# Patient Record
Sex: FEMALE | Race: OTHER
[De-identification: names, ages, dates, MRNs, and addresses within clinical notes are randomized per-mention and may not be internally consistent; named-entity substitution may affect disease eponyms.]

---

## 2020-03-27 ENCOUNTER — HOSPITAL ENCOUNTER (EMERGENCY)
Dept: HOSPITAL 61 - ER | Age: 45
Discharge: HOME | End: 2020-03-27
Payer: SELF-PAY

## 2020-03-27 VITALS — HEIGHT: 65 IN | WEIGHT: 165.57 LBS | BODY MASS INDEX: 27.58 KG/M2

## 2020-03-27 VITALS — SYSTOLIC BLOOD PRESSURE: 95 MMHG | DIASTOLIC BLOOD PRESSURE: 62 MMHG

## 2020-03-27 DIAGNOSIS — N39.0: Primary | ICD-10-CM

## 2020-03-27 DIAGNOSIS — E87.1: ICD-10-CM

## 2020-03-27 DIAGNOSIS — R10.13: ICD-10-CM

## 2020-03-27 DIAGNOSIS — R11.0: ICD-10-CM

## 2020-03-27 LAB
ALBUMIN SERPL-MCNC: 3.4 G/DL (ref 3.4–5)
ALBUMIN/GLOB SERPL: 0.7 {RATIO} (ref 1–1.7)
ALP SERPL-CCNC: 86 U/L (ref 46–116)
ALT SERPL-CCNC: 16 U/L (ref 14–59)
ANION GAP SERPL CALC-SCNC: 7 MMOL/L (ref 6–14)
APTT PPP: (no result) S
AST SERPL-CCNC: 17 U/L (ref 15–37)
BACTERIA #/AREA URNS HPF: (no result) /HPF
BASOPHILS # BLD AUTO: 0 X10^3/UL (ref 0–0.2)
BASOPHILS NFR BLD: 1 % (ref 0–3)
BILIRUB SERPL-MCNC: 0.3 MG/DL (ref 0.2–1)
BILIRUB UR QL STRIP: (no result)
BUN SERPL-MCNC: 4 MG/DL (ref 7–20)
BUN/CREAT SERPL: 6 (ref 6–20)
CALCIUM SERPL-MCNC: 9 MG/DL (ref 8.5–10.1)
CHLORIDE SERPL-SCNC: 94 MMOL/L (ref 98–107)
CO2 SERPL-SCNC: 28 MMOL/L (ref 21–32)
CREAT SERPL-MCNC: 0.7 MG/DL (ref 0.6–1)
EOSINOPHIL NFR BLD: 0.1 X10^3/UL (ref 0–0.7)
EOSINOPHIL NFR BLD: 1 % (ref 0–3)
ERYTHROCYTE [DISTWIDTH] IN BLOOD BY AUTOMATED COUNT: 15.1 % (ref 11.5–14.5)
FIBRINOGEN PPP-MCNC: CLEAR MG/DL
GFR SERPLBLD BASED ON 1.73 SQ M-ARVRAT: 90.1 ML/MIN
GLOBULIN SER-MCNC: 4.7 G/DL (ref 2.2–3.8)
GLUCOSE SERPL-MCNC: 85 MG/DL (ref 70–99)
HCT VFR BLD CALC: 34.4 % (ref 36–47)
HGB BLD-MCNC: 11.7 G/DL (ref 12–15.5)
LIPASE: 115 U/L (ref 73–393)
LYMPHOCYTES # BLD: 2.4 X10^3/UL (ref 1–4.8)
LYMPHOCYTES NFR BLD AUTO: 24 % (ref 24–48)
MCH RBC QN AUTO: 30 PG (ref 25–35)
MCHC RBC AUTO-ENTMCNC: 34 G/DL (ref 31–37)
MCV RBC AUTO: 87 FL (ref 79–100)
MONO #: 0.8 X10^3/UL (ref 0–1.1)
MONOCYTES NFR BLD: 8 % (ref 0–9)
NEUT #: 6.6 X10^3/UL (ref 1.8–7.7)
NEUTROPHILS NFR BLD AUTO: 66 % (ref 31–73)
NITRITE UR QL STRIP: NEGATIVE
PH UR STRIP: 7.5 [PH]
PLATELET # BLD AUTO: 275 X10^3/UL (ref 140–400)
POTASSIUM SERPL-SCNC: 3.7 MMOL/L (ref 3.5–5.1)
PROT SERPL-MCNC: 8.1 G/DL (ref 6.4–8.2)
PROT UR STRIP-MCNC: NEGATIVE MG/DL
RBC # BLD AUTO: 3.95 X10^6/UL (ref 3.5–5.4)
RBC #/AREA URNS HPF: 0 /HPF (ref 0–2)
SODIUM SERPL-SCNC: 129 MMOL/L (ref 136–145)
SQUAMOUS #/AREA URNS LPF: (no result) /LPF
UROBILINOGEN UR-MCNC: 1 MG/DL
WBC # BLD AUTO: 10 X10^3/UL (ref 4–11)

## 2020-03-27 PROCEDURE — 85025 COMPLETE CBC W/AUTO DIFF WBC: CPT

## 2020-03-27 PROCEDURE — 96374 THER/PROPH/DIAG INJ IV PUSH: CPT

## 2020-03-27 PROCEDURE — 99283 EMERGENCY DEPT VISIT LOW MDM: CPT

## 2020-03-27 PROCEDURE — 87086 URINE CULTURE/COLONY COUNT: CPT

## 2020-03-27 PROCEDURE — 84484 ASSAY OF TROPONIN QUANT: CPT

## 2020-03-27 PROCEDURE — 36415 COLL VENOUS BLD VENIPUNCTURE: CPT

## 2020-03-27 PROCEDURE — 80053 COMPREHEN METABOLIC PANEL: CPT

## 2020-03-27 PROCEDURE — 81001 URINALYSIS AUTO W/SCOPE: CPT

## 2020-03-27 PROCEDURE — 83690 ASSAY OF LIPASE: CPT

## 2020-03-27 NOTE — PHYS DOC
Adult General


Chief Complaint


Chief Complaint:  ABDOMINAL PAIN





HPI


HPI





Patient is a 44  year old  speaking female who presents with complaint 

of epigastric abdominal pain.  She is accompanied by her son who is interpreting

for her.  He reports the patient has been sick with some flulike symptoms 

approximately 1 week ago but seem to be doing better and over the past day or so

developed some epigastric abdominal pain with some nausea.  No vomiting or 

diarrhea or fever or chills reported.  No medications taken prior to arrival.  

No sick contacts.  Pain is mild to moderate





Review of Systems


Review of Systems


All other systems were reviewed and found to be within normal limits, except as 

documented in this note.





Current Medications


Current Medications





Current Medications








 Medications


  (Trade)  Dose


 Ordered  Sig/Candace  Start Time


 Stop Time Status Last Admin


Dose Admin


 


 Ondansetron HCl


  (Zofran)  4 mg  1X  ONCE  3/27/20 10:15


 3/27/20 10:16 DC 3/27/20 10:20


4 MG


 


 Sodium Chloride  1,000 ml @ 


 1,000 mls/hr  1X  ONCE  3/27/20 10:15


 3/27/20 11:14  3/27/20 10:20


1,000 MLS/HR











Allergies


Allergies





Allergies








Coded Allergies Type Severity Reaction Last Updated Verified


 


  No Known Drug Allergies    3/27/20 No











Physical Exam


Physical Exam





Constitutional: Well developed, well nourished, no acute distress, non-toxic 

appearance. []


HENT: Normocephalic, atraumatic, bilateral external ears normal, oropharynx 

moist, no oral exudates, nose normal. []


Eyes: PERRLA, EOMI, conjunctiva normal, no discharge. [] 


Neck: Normal range of motion, no tenderness, supple, no stridor. [] 


Cardiovascular:Heart rate regular rhythm, no murmur []


Lungs & Thorax:  Bilateral breath sounds clear to auscultation []


Abdomen: Bowel sounds normal, soft, mild epigastric tenderness to palpation, no 

masses, no pulsatile masses. [] 


Skin: Warm, dry, no erythema, no rash. [] 


Back: No tenderness, no CVA tenderness. [] 


Extremities: No tenderness, no cyanosis, no clubbing, ROM intact, no edema. [] 


Neurologic: Alert and oriented X 3, normal motor function, normal sensory 

function, no focal deficits noted. []


Psychologic: Affect normal, judgement normal, mood normal. []





Current Patient Data


Vital Signs





                                   Vital Signs








  Date Time  Temp Pulse Resp B/P (MAP) Pulse Ox O2 Delivery O2 Flow Rate FiO2


 


3/27/20 09:46 99.1 86 16 100/68 (79) 97 Room Air  





 99.1       








Lab Values





                                Laboratory Tests








Test


 3/27/20


09:51 3/27/20


10:10


 


Urine Collection Type Unknown   


 


Urine Color Deepa   


 


Urine Clarity Clear   


 


Urine pH


 7.5 (<5.0-8.0)


 





 


Urine Specific Gravity


 1.015


(1.000-1.030) 





 


Urine Protein


 Negative mg/dL


(NEG-TRACE) 





 


Urine Glucose (UA)


 Negative mg/dL


(NEG) 





 


Urine Ketones (Stick)


 15 mg/dL (NEG)


 





 


Urine Blood


 Negative (NEG)


 





 


Urine Nitrite


 Negative (NEG)


 





 


Urine Bilirubin Small (NEG)   


 


Urine Urobilinogen Dipstick


 1.0 mg/dL (0.2


mg/dL) 





 


Urine Leukocyte Esterase


 Moderate (NEG)


 





 


Urine RBC 0 /HPF (0-2)   


 


Urine WBC


 11-20 /HPF


(0-4) 





 


Urine Squamous Epithelial


Cells Mod /LPF  


 





 


Urine Bacteria


 Few /HPF


(0-FEW) 





 


Urine Mucus Marked /LPF   


 


White Blood Count


 


 10.0 x10^3/uL


(4.0-11.0)


 


Red Blood Count


 


 3.95 x10^6/uL


(3.50-5.40)


 


Hemoglobin


 


 11.7 g/dL


(12.0-15.5)  L


 


Hematocrit


 


 34.4 %


(36.0-47.0)  L


 


Mean Corpuscular Volume


 


 87 fL ()





 


Mean Corpuscular Hemoglobin  30 pg (25-35)  


 


Mean Corpuscular Hemoglobin


Concent 


 34 g/dL


(31-37)


 


Red Cell Distribution Width


 


 15.1 %


(11.5-14.5)  H


 


Platelet Count


 


 275 x10^3/uL


(140-400)


 


Neutrophils (%) (Auto)  66 % (31-73)  


 


Lymphocytes (%) (Auto)  24 % (24-48)  


 


Monocytes (%) (Auto)  8 % (0-9)  


 


Eosinophils (%) (Auto)  1 % (0-3)  


 


Basophils (%) (Auto)  1 % (0-3)  


 


Neutrophils # (Auto)


 


 6.6 x10^3/uL


(1.8-7.7)


 


Lymphocytes # (Auto)


 


 2.4 x10^3/uL


(1.0-4.8)


 


Monocytes # (Auto)


 


 0.8 x10^3/uL


(0.0-1.1)


 


Eosinophils # (Auto)


 


 0.1 x10^3/uL


(0.0-0.7)


 


Basophils # (Auto)


 


 0.0 x10^3/uL


(0.0-0.2)


 


Sodium Level


 


 129 mmol/L


(136-145)  L


 


Potassium Level


 


 3.7 mmol/L


(3.5-5.1)


 


Chloride Level


 


 94 mmol/L


()  L


 


Carbon Dioxide Level


 


 28 mmol/L


(21-32)


 


Anion Gap  7 (6-14)  


 


Blood Urea Nitrogen


 


 4 mg/dL (7-20)


L


 


Creatinine


 


 0.7 mg/dL


(0.6-1.0)


 


Estimated GFR


(Cockcroft-Gault) 


 90.1  





 


BUN/Creatinine Ratio  6 (6-20)  


 


Glucose Level


 


 85 mg/dL


(70-99)


 


Calcium Level


 


 9.0 mg/dL


(8.5-10.1)


 


Total Bilirubin


 


 0.3 mg/dL


(0.2-1.0)


 


Aspartate Amino Transferase


(AST) 


 17 U/L (15-37)





 


Alanine Aminotransferase (ALT)


 


 16 U/L (14-59)





 


Alkaline Phosphatase


 


 86 U/L


()


 


Troponin I Quantitative


 


 < 0.017 ng/mL


(0.000-0.055)


 


Total Protein


 


 8.1 g/dL


(6.4-8.2)


 


Albumin


 


 3.4 g/dL


(3.4-5.0)


 


Albumin/Globulin Ratio


 


 0.7 (1.0-1.7)


L


 


Lipase


 


 115 U/L


()





                                Laboratory Tests


3/27/20 10:10








                                Laboratory Tests


3/27/20 10:10











EKG


EKG


[]





Radiology/Procedures


Radiology/Procedures


[]





Course & Med Decision Making


Course & Med Decision Making


Pertinent Labs and Imaging studies reviewed. (See chart for details)





1006: We will start with IV fluids and basic labs give Zofran as well.





1054: Work-up is complete at this time and is remarkable for mild hyponatremia 

and a urinary tract infection.  Patient will be given a prescription for Bactrim

 twice daily for 7 days and also Zofran to help with nausea and to settle her 

stomach.  As her diet improves her sodium will likely improve as well.





Dragon Disclaimer


Dragon Disclaimer


This electronic medical record was generated, in whole or in part, using a voice

 recognition dictation system.





Departure


Departure


Impression:  


   Primary Impression:  


   Urinary tract infection


   Additional Impression:  


   Hyponatremia


Disposition:  01 HOME, SELF-CARE


Condition:  STABLE


Referrals:  


NO PCP (PCP)


Patient Instructions:  Urinary Tract Infection





Additional Instructions:  


Push oral fluids


Scripts


Ondansetron Hcl (ZOFRAN) 4 Mg Tablet


1 TAB PO PRN Q6-8HRS for nausea, #12 TAB


   Prov: FLOR HUYNH DO         3/27/20 


Sulfamethoxazole/Trimethoprim (BACTRIM DS TABLET) 1 Each Tablet


1 TAB PO BID for infection, #14 TAB


   Prov: FLOR HUYNH DO         3/27/20





Problem Qualifiers











FLOR HUYNH DO               Mar 27, 2020 10:07